# Patient Record
Sex: MALE | URBAN - NONMETROPOLITAN AREA
[De-identification: names, ages, dates, MRNs, and addresses within clinical notes are randomized per-mention and may not be internally consistent; named-entity substitution may affect disease eponyms.]

---

## 2017-07-17 ENCOUNTER — APPOINTMENT (OUTPATIENT)
Dept: URBAN - NONMETROPOLITAN AREA CLINIC 44 | Age: 57
Setting detail: DERMATOLOGY
End: 2017-07-17

## 2017-07-17 DIAGNOSIS — L259 CONTACT DERMATITIS AND OTHER ECZEMA, UNSPECIFIED CAUSE: ICD-10-CM

## 2017-07-17 DIAGNOSIS — L81.4 OTHER MELANIN HYPERPIGMENTATION: ICD-10-CM

## 2017-07-17 PROBLEM — L23.9 ALLERGIC CONTACT DERMATITIS, UNSPECIFIED CAUSE: Status: ACTIVE | Noted: 2017-07-17

## 2017-07-17 PROCEDURE — 87220 TISSUE EXAM FOR FUNGI: CPT

## 2017-07-17 PROCEDURE — OTHER PRESCRIPTION: OTHER

## 2017-07-17 PROCEDURE — OTHER MIPS QUALITY: OTHER

## 2017-07-17 PROCEDURE — 99202 OFFICE O/P NEW SF 15 MIN: CPT

## 2017-07-17 PROCEDURE — OTHER COUNSELING: OTHER

## 2017-07-17 PROCEDURE — OTHER TREATMENT REGIMEN: OTHER

## 2017-07-17 PROCEDURE — OTHER KOH PREP: OTHER

## 2017-07-17 RX ORDER — TRIAMCINOLONE ACETONIDE 0.1 %
OINTMENT (GRAM) TOPICAL BID PRN
Qty: 1 | Refills: 0 | Status: ERX | COMMUNITY
Start: 2017-07-17

## 2017-07-17 ASSESSMENT — LOCATION DETAILED DESCRIPTION DERM
LOCATION DETAILED: RIGHT INFERIOR VERMILION LIP
LOCATION DETAILED: RIGHT SUPERIOR VERMILION LIP
LOCATION DETAILED: RIGHT ORAL COMMISSURE

## 2017-07-17 ASSESSMENT — SEVERITY ASSESSMENT: SEVERITY: MODERATE

## 2017-07-17 ASSESSMENT — LOCATION ZONE DERM: LOCATION ZONE: LIP

## 2017-07-17 ASSESSMENT — LOCATION SIMPLE DESCRIPTION DERM: LOCATION SIMPLE: RIGHT LIP

## 2017-07-17 NOTE — PROCEDURE: TREATMENT REGIMEN
Detail Level: Simple
Discontinue Regimen: Eliminating all OTC toothpaste, lip balms, and ointments
Plan: Patch testing in reserve
Initiate Treatment: Brushing teeth with baking soda, triamcinolone ointment apply bid prn, will switch to pure Vaseline once cleared.

## 2017-07-17 NOTE — PROCEDURE: KOH PREP
Koh Intro Text (From The.....): A KOH prep was ordered and evaluated from the
Showing: negative findings within normal realm
Detail Level: Detailed